# Patient Record
Sex: FEMALE | Race: WHITE | Employment: FULL TIME | URBAN - METROPOLITAN AREA
[De-identification: names, ages, dates, MRNs, and addresses within clinical notes are randomized per-mention and may not be internally consistent; named-entity substitution may affect disease eponyms.]

---

## 2022-10-14 ENCOUNTER — HOSPITAL ENCOUNTER (OUTPATIENT)
Age: 27
Setting detail: SPECIMEN
Discharge: HOME | End: 2022-10-14

## 2022-10-14 LAB
FERRITIN: 22 NG/ML (ref 13–150)
HCT VFR BLD CALC: 36.6 % (ref 36.3–47.1)
HEMOGLOBIN: 11.9 G/DL (ref 11.9–15.1)
IRON: 142 UG/DL (ref 37–145)
MCH RBC QN AUTO: 26.4 PG (ref 25.2–33.5)
MCHC RBC AUTO-ENTMCNC: 32.5 G/DL (ref 28.4–34.8)
MCV RBC AUTO: 81.3 FL (ref 82.6–102.9)
NRBC AUTOMATED: 0 PER 100 WBC
PDW BLD-RTO: 14 % (ref 11.8–14.4)
PLATELET # BLD: 229 K/UL (ref 138–453)
PMV BLD AUTO: 11 FL (ref 8.1–13.5)
RBC # BLD: 4.5 M/UL (ref 3.95–5.11)
WBC # BLD: 5.2 K/UL (ref 3.5–11.3)

## 2022-10-20 ENCOUNTER — HOSPITAL ENCOUNTER (OUTPATIENT)
Age: 27
Setting detail: SPECIMEN
Discharge: HOME | End: 2022-10-20

## 2022-10-20 LAB
ABSOLUTE EOS #: 0.07 K/UL (ref 0–0.44)
ABSOLUTE IMMATURE GRANULOCYTE: <0.03 K/UL (ref 0–0.3)
ABSOLUTE LYMPH #: 1.7 K/UL (ref 1.1–3.7)
ABSOLUTE MONO #: 0.19 K/UL (ref 0.1–1.2)
ABSOLUTE RETIC #: 0.1 M/UL (ref 0.03–0.08)
BASOPHILS # BLD: 1 % (ref 0–2)
BASOPHILS ABSOLUTE: 0.06 K/UL (ref 0–0.2)
EOSINOPHILS RELATIVE PERCENT: 2 % (ref 1–4)
HCT VFR BLD CALC: 37.3 % (ref 36.3–47.1)
HEMOGLOBIN: 11.8 G/DL (ref 11.9–15.1)
IMMATURE GRANULOCYTES: 0 %
IMMATURE RETIC FRACT: 20.2 % (ref 2.7–18.3)
LYMPHOCYTES # BLD: 38 % (ref 24–43)
MCH RBC QN AUTO: 26.5 PG (ref 25.2–33.5)
MCHC RBC AUTO-ENTMCNC: 31.6 G/DL (ref 28.4–34.8)
MCV RBC AUTO: 83.8 FL (ref 82.6–102.9)
MONOCYTES # BLD: 4 % (ref 3–12)
NRBC AUTOMATED: 0 PER 100 WBC
PDW BLD-RTO: 14.4 % (ref 11.8–14.4)
PLATELET # BLD: 217 K/UL (ref 138–453)
PMV BLD AUTO: 11.8 FL (ref 8.1–13.5)
RBC # BLD: 4.45 M/UL (ref 3.95–5.11)
RETIC %: 2.3 % (ref 0.5–1.9)
RETIC HEMOGLOBIN: 30.9 PG (ref 28.2–35.7)
SEG NEUTROPHILS: 55 % (ref 36–65)
SEGMENTED NEUTROPHILS ABSOLUTE COUNT: 2.46 K/UL (ref 1.5–8.1)
WBC # BLD: 4.5 K/UL (ref 3.5–11.3)

## 2022-10-21 LAB — SURGICAL PATHOLOGY REPORT: NORMAL

## 2022-10-22 LAB — PATHOLOGIST REVIEW: NORMAL

## 2023-06-26 ENCOUNTER — OFFICE VISIT (OUTPATIENT)
Age: 28
End: 2023-06-26
Payer: COMMERCIAL

## 2023-06-26 ENCOUNTER — HOSPITAL ENCOUNTER (OUTPATIENT)
Age: 28
Setting detail: SPECIMEN
Discharge: HOME | End: 2023-06-26

## 2023-06-26 VITALS
RESPIRATION RATE: 12 BRPM | BODY MASS INDEX: 42.75 KG/M2 | SYSTOLIC BLOOD PRESSURE: 112 MMHG | DIASTOLIC BLOOD PRESSURE: 63 MMHG | HEIGHT: 67 IN | HEART RATE: 65 BPM | WEIGHT: 272.4 LBS

## 2023-06-26 DIAGNOSIS — E66.01 CLASS 3 SEVERE OBESITY WITHOUT SERIOUS COMORBIDITY WITH BODY MASS INDEX (BMI) OF 40.0 TO 44.9 IN ADULT, UNSPECIFIED OBESITY TYPE (HCC): ICD-10-CM

## 2023-06-26 DIAGNOSIS — Z11.3 ROUTINE SCREENING FOR STI (SEXUALLY TRANSMITTED INFECTION): ICD-10-CM

## 2023-06-26 DIAGNOSIS — D50.9 MICROCYTIC ANEMIA: ICD-10-CM

## 2023-06-26 DIAGNOSIS — Z00.00 WELL WOMAN EXAM (NO GYNECOLOGICAL EXAM): Primary | ICD-10-CM

## 2023-06-26 DIAGNOSIS — Z00.00 WELL WOMAN EXAM (NO GYNECOLOGICAL EXAM): ICD-10-CM

## 2023-06-26 DIAGNOSIS — Z78.9 NONSMOKER: ICD-10-CM

## 2023-06-26 PROBLEM — E66.813 CLASS 3 SEVERE OBESITY WITHOUT SERIOUS COMORBIDITY WITH BODY MASS INDEX (BMI) OF 40.0 TO 44.9 IN ADULT (HCC): Status: ACTIVE | Noted: 2023-06-26

## 2023-06-26 LAB
25(OH)D3 SERPL-MCNC: 27.9 NG/ML
ANION GAP SERPL CALCULATED.3IONS-SCNC: 11 MMOL/L (ref 9–17)
BASOPHILS # BLD: 0.07 K/UL (ref 0–0.2)
BASOPHILS NFR BLD: 1 % (ref 0–2)
BUN SERPL-MCNC: 12 MG/DL (ref 6–20)
CALCIUM SERPL-MCNC: 9.2 MG/DL (ref 8.6–10.4)
CHLORIDE SERPL-SCNC: 103 MMOL/L (ref 98–107)
CO2 SERPL-SCNC: 24 MMOL/L (ref 20–31)
CREAT SERPL-MCNC: 0.9 MG/DL (ref 0.5–0.9)
EOSINOPHIL # BLD: 0.08 K/UL (ref 0–0.44)
EOSINOPHILS RELATIVE PERCENT: 1 % (ref 1–4)
ERYTHROCYTE [DISTWIDTH] IN BLOOD BY AUTOMATED COUNT: 14.3 % (ref 11.8–14.4)
FERRITIN SERPL-MCNC: 14 NG/ML (ref 13–150)
GFR SERPL CREATININE-BSD FRML MDRD: >60 ML/MIN/1.73M2
GLUCOSE SERPL-MCNC: 80 MG/DL (ref 70–99)
HCT VFR BLD AUTO: 37.5 % (ref 36.3–47.1)
HGB BLD-MCNC: 11.7 G/DL (ref 11.9–15.1)
IMM GRANULOCYTES # BLD AUTO: <0.03 K/UL (ref 0–0.3)
IMM GRANULOCYTES NFR BLD: 0 %
IRON SATN MFR SERPL: 15 % (ref 20–55)
IRON SERPL-MCNC: 55 UG/DL (ref 37–145)
LYMPHOCYTES # BLD: 32 % (ref 24–43)
LYMPHOCYTES NFR BLD: 1.88 K/UL (ref 1.1–3.7)
MCH RBC QN AUTO: 25.4 PG (ref 25.2–33.5)
MCHC RBC AUTO-ENTMCNC: 31.2 G/DL (ref 28.4–34.8)
MCV RBC AUTO: 81.5 FL (ref 82.6–102.9)
MONOCYTES NFR BLD: 0.41 K/UL (ref 0.1–1.2)
MONOCYTES NFR BLD: 7 % (ref 3–12)
NEUTROPHILS NFR BLD: 59 % (ref 36–65)
NEUTS SEG NFR BLD: 3.51 K/UL (ref 1.5–8.1)
NRBC BLD-RTO: 0 PER 100 WBC
PLATELET # BLD AUTO: 220 K/UL (ref 138–453)
PMV BLD AUTO: 10.8 FL (ref 8.1–13.5)
POTASSIUM SERPL-SCNC: 3.9 MMOL/L (ref 3.7–5.3)
RBC # BLD AUTO: 4.6 M/UL (ref 3.95–5.11)
RBC # BLD: ABNORMAL 10*6/UL
SODIUM SERPL-SCNC: 138 MMOL/L (ref 135–144)
T PALLIDUM AB SER QL IA: NONREACTIVE
TIBC SERPL-MCNC: 370 UG/DL (ref 250–450)
UNSATURATED IRON BINDING CAPACITY: 315 UG/DL (ref 112–347)
WBC OTHER # BLD: 6 K/UL (ref 3.5–11.3)

## 2023-06-26 PROCEDURE — 99385 PREV VISIT NEW AGE 18-39: CPT | Performed by: FAMILY MEDICINE

## 2023-06-26 RX ORDER — M-VIT,TX,IRON,MINS/CALC/FOLIC 27MG-0.4MG
1 TABLET ORAL DAILY
COMMUNITY

## 2023-06-26 RX ORDER — FERROUS SULFATE 325(65) MG
325 TABLET ORAL PRN
COMMUNITY

## 2023-06-26 SDOH — ECONOMIC STABILITY: FOOD INSECURITY: WITHIN THE PAST 12 MONTHS, YOU WORRIED THAT YOUR FOOD WOULD RUN OUT BEFORE YOU GOT MONEY TO BUY MORE.: NEVER TRUE

## 2023-06-26 SDOH — ECONOMIC STABILITY: FOOD INSECURITY: WITHIN THE PAST 12 MONTHS, THE FOOD YOU BOUGHT JUST DIDN'T LAST AND YOU DIDN'T HAVE MONEY TO GET MORE.: NEVER TRUE

## 2023-06-26 SDOH — ECONOMIC STABILITY: INCOME INSECURITY: HOW HARD IS IT FOR YOU TO PAY FOR THE VERY BASICS LIKE FOOD, HOUSING, MEDICAL CARE, AND HEATING?: NOT HARD AT ALL

## 2023-06-26 SDOH — ECONOMIC STABILITY: HOUSING INSECURITY
IN THE LAST 12 MONTHS, WAS THERE A TIME WHEN YOU DID NOT HAVE A STEADY PLACE TO SLEEP OR SLEPT IN A SHELTER (INCLUDING NOW)?: NO

## 2023-06-26 ASSESSMENT — PATIENT HEALTH QUESTIONNAIRE - PHQ9
2. FEELING DOWN, DEPRESSED OR HOPELESS: 0
SUM OF ALL RESPONSES TO PHQ QUESTIONS 1-9: 0
1. LITTLE INTEREST OR PLEASURE IN DOING THINGS: 0
SUM OF ALL RESPONSES TO PHQ9 QUESTIONS 1 & 2: 0
SUM OF ALL RESPONSES TO PHQ QUESTIONS 1-9: 0

## 2023-06-27 LAB
CHLAMYDIA DNA UR QL NAA+PROBE: NEGATIVE
HIV 1+2 AB+HIV1 P24 AG SERPL QL IA: NONREACTIVE
N GONORRHOEA DNA UR QL NAA+PROBE: NEGATIVE
SPECIMEN DESCRIPTION: NORMAL

## 2023-09-28 ASSESSMENT — ENCOUNTER SYMPTOMS
VOMITING: 0
CONSTIPATION: 0
ABDOMINAL PAIN: 0
SHORTNESS OF BREATH: 0
DIARRHEA: 0
SORE THROAT: 0
NAUSEA: 0

## 2023-09-28 NOTE — PROGRESS NOTES
[address] [facility] [address] [address] [facility]  [address] [facility], [facility] [facility]  Phone: [phone]  Fax: [phone]      Date of Visit:  10/1/2023  Patient Name: Trevor Magdaleno   Patient :  1995     HPI:     Trevor Magdaleno is a 28 y.o. female who presents today to discuss   Chief Complaint   Patient presents with    Snoring     Patient states that she has snore most of her life. Patient want to discuss [facility] that is non hormonal.      Patient presents to discuss starting birth control for pregnancy prevention/menorrhagia and snoring. Contraception/menorrhagia  Patient has a history of menorrhagia. She also recently moved in with her boyfriend and is sexually active but not actively planning for a pregnancy at this time. Her LMP was on 23 lasting 7-8 days with heavy flow. Menses occur every 28 days without significant cramping. Last pap 10/18/21 and negative. Patient is a nonsmoker and has never had migraines with aura, breast cancer, VTE, or ischemic heart disease. Pertinent family history includes mother being diagnosed with breast cancer at age 52. Snoring  Patient has a history of snoring ever since she was a child. Family and boyfriend do not report any witnessed apneic episodes. Snoring is worsened with fatigue and nasal congestion. She denies any daytime fatigue, sleepiness, or drowsiness. She reports sleep has improved since weight loss of about 20 lbs. I personally reviewed the patient's past medical history, current medications, allergies, surgical history, family history and social history. Updates were made as necessary. REVIEW OF SYSTEM      Review of Systems   Constitutional:  Negative for chills and fever. HENT:  Negative for congestion and sore throat. Snoring   Respiratory:  Negative for apnea, cough, shortness of breath and wheezing. Cardiovascular:  Negative for chest pain.    Gastrointestinal:  Negative for abdominal pain,

## 2023-09-29 ENCOUNTER — OFFICE VISIT (OUTPATIENT)
Age: 28
End: 2023-09-29
Payer: COMMERCIAL

## 2023-09-29 VITALS
HEIGHT: 67 IN | HEART RATE: 70 BPM | WEIGHT: 260.6 LBS | BODY MASS INDEX: 40.9 KG/M2 | TEMPERATURE: 98 F | SYSTOLIC BLOOD PRESSURE: 135 MMHG | RESPIRATION RATE: 16 BRPM | DIASTOLIC BLOOD PRESSURE: 50 MMHG

## 2023-09-29 DIAGNOSIS — N94.6 DYSMENORRHEA: ICD-10-CM

## 2023-09-29 DIAGNOSIS — R06.83 SNORING: ICD-10-CM

## 2023-09-29 DIAGNOSIS — E66.01 CLASS 3 SEVERE OBESITY WITHOUT SERIOUS COMORBIDITY WITH BODY MASS INDEX (BMI) OF 40.0 TO 44.9 IN ADULT, UNSPECIFIED OBESITY TYPE (HCC): ICD-10-CM

## 2023-09-29 DIAGNOSIS — N92.0 MENORRHAGIA WITH REGULAR CYCLE: Primary | ICD-10-CM

## 2023-09-29 PROCEDURE — 81025 URINE PREGNANCY TEST: CPT

## 2023-09-29 PROCEDURE — 99214 OFFICE O/P EST MOD 30 MIN: CPT

## 2023-09-29 PROCEDURE — 99211 OFF/OP EST MAY X REQ PHY/QHP: CPT

## 2023-09-29 RX ORDER — NORGESTIMATE AND ETHINYL ESTRADIOL 0.25-0.035
1 KIT ORAL DAILY
Qty: 1 PACKET | Refills: 3 | Status: SHIPPED | OUTPATIENT
Start: 2023-09-29

## 2023-09-29 RX ORDER — NORGESTIMATE AND ETHINYL ESTRADIOL 0.25-0.035
1 KIT ORAL DAILY
Qty: 1 PACKET | Refills: 3 | Status: CANCELLED | OUTPATIENT
Start: 2023-09-29

## 2023-09-29 RX ORDER — FLUTICASONE PROPIONATE 50 MCG
2 SPRAY, SUSPENSION (ML) NASAL DAILY
Qty: 16 G | Refills: 0 | Status: CANCELLED | OUTPATIENT
Start: 2023-09-29

## 2023-09-29 NOTE — PATIENT INSTRUCTIONS
Thank you for following up with us at [facility] outpatient residency clinic! It was a pleasure to meet you today! Our plan is the following:  - Please  your medications from the pharmacy.  - You can start taking the birth control pill any time. Just make sure you take it daily and around the same time. See handout for further information.  - For the snoring, you may try Flonase, nasal saline rinses and Zyrtec to see if this is secondary to allergies. If you have any additional questions or concerns, please call the office ([phone]) and speak to one of the staff. They will triage and forward the message to the doctors! Have a great rest of your day!

## 2023-10-01 ASSESSMENT — ENCOUNTER SYMPTOMS
APNEA: 0
WHEEZING: 0
COUGH: 0

## 2023-10-29 NOTE — PROGRESS NOTES
[address] [facility]  [address] [facility], [address]  Phone: [phone]  Fax: [phone]      Date of Visit:  2023  Patient Name: Rojas Neves   Patient :  1995     HPI:     Rojas Nevse is a 28 y.o. female who presents today to discuss   Chief Complaint   Patient presents with    Snoring     Sprintec check cramping  frequent headaches nausea brain fog heavy period      This is a 28 year female with a history of iron deficiency anemia, obesity, and menorrhagia who presents to follow up on medication. She was started on Sprintec 28 at the last visit for menorrhagia and contraception. Her LMP was around 10/3/23. Patient complains of heavy bleeding (more than baseline), frequent intermittent headaches, nausea, cramping, fatigue, and random bouts of anxiety while taking the medication. Pain was well-controlled with ibuprofen. Symptoms resolve when patient is taking non-hormonal placebo pills. Patient would like to continue OCP for one more month and observe for changes in symptoms. Her fiance will be going out of country in a couple of months should she need a break from OCPs. No other acute concerns. I personally reviewed the patient's past medical history, current medications, allergies, surgical history, family history and social history. Updates were made as necessary. REVIEW OF SYSTEM      Review of Systems   Constitutional:  Positive for fatigue. Negative for chills and fever. Gastrointestinal:  Positive for abdominal pain (intermittent cramping) and nausea. Negative for constipation and diarrhea. Genitourinary:  Positive for menstrual problem (increased bleeding during period). Negative for dyspareunia, dysuria, frequency and urgency. Neurological:  Positive for headaches (intermittent). Psychiatric/Behavioral:  The patient is nervous/anxious (intermittent).         REVIEWED INFORMATION      Allergies   Allergen

## 2023-11-01 ENCOUNTER — OFFICE VISIT (OUTPATIENT)
Age: 28
End: 2023-11-01
Payer: COMMERCIAL

## 2023-11-01 VITALS
BODY MASS INDEX: 41.11 KG/M2 | DIASTOLIC BLOOD PRESSURE: 65 MMHG | SYSTOLIC BLOOD PRESSURE: 132 MMHG | HEART RATE: 61 BPM | WEIGHT: 258.6 LBS | RESPIRATION RATE: 12 BRPM

## 2023-11-01 DIAGNOSIS — N92.0 MENORRHAGIA WITH REGULAR CYCLE: Primary | ICD-10-CM

## 2023-11-01 DIAGNOSIS — R11.0 NAUSEA: ICD-10-CM

## 2023-11-01 DIAGNOSIS — Z78.9 NONSMOKER: ICD-10-CM

## 2023-11-01 DIAGNOSIS — E66.01 CLASS 3 SEVERE OBESITY WITHOUT SERIOUS COMORBIDITY WITH BODY MASS INDEX (BMI) OF 40.0 TO 44.9 IN ADULT, UNSPECIFIED OBESITY TYPE (HCC): ICD-10-CM

## 2023-11-01 PROCEDURE — 99211 OFF/OP EST MAY X REQ PHY/QHP: CPT

## 2023-11-01 RX ORDER — ONDANSETRON 4 MG/1
4 TABLET, FILM COATED ORAL 3 TIMES DAILY PRN
Qty: 30 TABLET | Refills: 0 | Status: SHIPPED | OUTPATIENT
Start: 2023-11-01

## 2023-11-01 ASSESSMENT — ENCOUNTER SYMPTOMS
NAUSEA: 1
ABDOMINAL PAIN: 1
CONSTIPATION: 0
DIARRHEA: 0

## 2023-11-01 NOTE — PATIENT INSTRUCTIONS
Thank you for following up with us at [facility] outpatient residency clinic! It was a pleasure to meet you today! Our plan is the following:  - Please  your medications from the pharmacy. Please use as needed for nausea and vomiting.  - We will continue to monitor symptoms on the oral contraceptive. Please let me know if bleeding or headaches worsen, lightheadedness, dizziness, or other symptoms arise. It may take some time for your body to adjust to the medication, but certainly reach out if the symptoms become unbearable. - Continue working out, eating healthy and staying hydrated. Weight loss can improve both the snoring and the heaviness of the periods. If you have any additional questions or concerns, please call the office ([phone]) and speak to one of the staff. They will triage and forward the message to the doctors! Have a great rest of your day!

## 2023-12-07 PROBLEM — N92.0 MENORRHAGIA WITH REGULAR CYCLE: Status: ACTIVE | Noted: 2023-12-07

## 2023-12-07 NOTE — PROGRESS NOTES
occurred      Patient was discussed with preceptor Dr. Theresa Myrick at the time of the encounter.      Electronically signed by Mo Hurtado MD on 12/8/2023 at 1:27 PM

## 2023-12-08 ENCOUNTER — HOSPITAL ENCOUNTER (OUTPATIENT)
Age: 28
Setting detail: SPECIMEN
Discharge: HOME | End: 2023-12-08

## 2023-12-08 ENCOUNTER — OFFICE VISIT (OUTPATIENT)
Age: 28
End: 2023-12-08
Payer: COMMERCIAL

## 2023-12-08 VITALS
SYSTOLIC BLOOD PRESSURE: 126 MMHG | RESPIRATION RATE: 18 BRPM | BODY MASS INDEX: 40.05 KG/M2 | HEART RATE: 84 BPM | WEIGHT: 251.9 LBS | TEMPERATURE: 98.1 F | DIASTOLIC BLOOD PRESSURE: 69 MMHG

## 2023-12-08 DIAGNOSIS — N92.0 MENORRHAGIA WITH REGULAR CYCLE: Primary | ICD-10-CM

## 2023-12-08 DIAGNOSIS — N92.0 MENORRHAGIA WITH REGULAR CYCLE: ICD-10-CM

## 2023-12-08 DIAGNOSIS — D50.9 MICROCYTIC ANEMIA: ICD-10-CM

## 2023-12-08 DIAGNOSIS — Z78.9 NONSMOKER: ICD-10-CM

## 2023-12-08 LAB
ERYTHROCYTE [DISTWIDTH] IN BLOOD BY AUTOMATED COUNT: 12.6 % (ref 11.8–14.4)
HCT VFR BLD AUTO: 35.1 % (ref 36.3–47.1)
HGB BLD-MCNC: 10.9 G/DL (ref 11.9–15.1)
MCH RBC QN AUTO: 26.4 PG (ref 25.2–33.5)
MCHC RBC AUTO-ENTMCNC: 31.1 G/DL (ref 28.4–34.8)
MCV RBC AUTO: 85 FL (ref 82.6–102.9)
NRBC BLD-RTO: 0 PER 100 WBC
PLATELET # BLD AUTO: 257 K/UL (ref 138–453)
PMV BLD AUTO: 11.4 FL (ref 8.1–13.5)
RBC # BLD AUTO: 4.13 M/UL (ref 3.95–5.11)
WBC OTHER # BLD: 6 K/UL (ref 3.5–11.3)

## 2023-12-08 PROCEDURE — 99212 OFFICE O/P EST SF 10 MIN: CPT

## 2023-12-08 PROCEDURE — 99213 OFFICE O/P EST LOW 20 MIN: CPT

## 2023-12-08 ASSESSMENT — ENCOUNTER SYMPTOMS
CONSTIPATION: 0
DIARRHEA: 0
ABDOMINAL PAIN: 0
VOMITING: 0
SHORTNESS OF BREATH: 0

## 2023-12-08 NOTE — PATIENT INSTRUCTIONS
Thank you for following up with us at [facility] outpatient residency clinic! It was a pleasure to meet you today! Our plan is the following:  - Please get your labs done. My office or I will reach out with the results once they are available. - Please take your iron daily. Make sure you drink enough water throughout the day to help prevent constipation and dehydration. If you have any additional questions or concerns, please call the office ([phone]) and speak to one of the staff. They will triage and forward the message to the doctors! Have a great rest of your day!

## 2023-12-14 LAB
FERRITIN SERPL-MCNC: 11 NG/ML (ref 13–150)
IRON SATN MFR SERPL: 46 % (ref 20–55)
IRON SERPL-MCNC: 168 UG/DL (ref 37–145)
TIBC SERPL-MCNC: 368 UG/DL (ref 250–450)
UNSATURATED IRON BINDING CAPACITY: 200 UG/DL (ref 112–347)

## 2024-07-18 PROBLEM — D50.0 IRON DEFICIENCY ANEMIA DUE TO CHRONIC BLOOD LOSS: Status: ACTIVE | Noted: 2024-07-18

## 2024-07-18 NOTE — PROGRESS NOTES
[facility]  [address] [place] [address]  Phone: [phone]  Fax: [phone]      Date of Visit:  2024  Patient Name: Mark Horne   Patient :  1995     HPI:     Mark Horne is a 28 y.o. female who presents today to discuss   Chief Complaint   Patient presents with    Follow-up     6 month check up- Wants referral to general surgeon for pilonidal cyst, has gone before but just didn't have it removed...    Off BC for 6 months now...periods lighter    Labs for iron    When is she due for pap??   This is a 27 year female with a history of iron deficiency anemia, obesity, and menorrhagia who presents to follow up visit.     Patient has been relatively well since the last visit.  She has been compliant with her medications.  States that ever since discontinuing birth control she has noted her periods to be slightly lighter than before.  Menses are are monthly and lasting 7 days with heavy flow for the first 2 days followed by light flow.  Previously, her periods were heavy every single day and lasted about 8 days.  No dysmenorrhea.  She denies any fatigue, lightheadedness, craving for ice chips, weakness.  Patient is not on any oral contraceptives at this time.  She does take a daily multivitamin.  Her fiancé/significant other is currently out of country so she is not sexually active.    Patient would like a referral to general surgery for excision of recurring pilonidal cyst.  She had a flareup about a week ago.  The cyst was draining bloody discharge and tender to touch.  She denies any fevers or chills, or swelling.  States that the cyst has since healed and is not bothering her today but she would like this to be addressed at future recurrence.  No other acute concerns    I personally reviewed the patient's past medical history, current medications, allergies, surgical history, family history and social history.  Updates

## 2024-07-19 ENCOUNTER — OFFICE VISIT (OUTPATIENT)
Age: 29
End: 2024-07-19
Payer: COMMERCIAL

## 2024-07-19 ENCOUNTER — HOSPITAL ENCOUNTER (OUTPATIENT)
Age: 29
Setting detail: SPECIMEN
Discharge: HOME | End: 2024-07-19

## 2024-07-19 VITALS
WEIGHT: 278 LBS | SYSTOLIC BLOOD PRESSURE: 120 MMHG | DIASTOLIC BLOOD PRESSURE: 77 MMHG | HEIGHT: 67 IN | TEMPERATURE: 98.3 F | HEART RATE: 80 BPM | BODY MASS INDEX: 43.63 KG/M2 | RESPIRATION RATE: 16 BRPM

## 2024-07-19 DIAGNOSIS — L05.91 PILONIDAL CYST: ICD-10-CM

## 2024-07-19 DIAGNOSIS — N92.0 MENORRHAGIA WITH REGULAR CYCLE: ICD-10-CM

## 2024-07-19 DIAGNOSIS — D50.0 IRON DEFICIENCY ANEMIA DUE TO CHRONIC BLOOD LOSS: ICD-10-CM

## 2024-07-19 DIAGNOSIS — D50.0 IRON DEFICIENCY ANEMIA DUE TO CHRONIC BLOOD LOSS: Primary | ICD-10-CM

## 2024-07-19 LAB
ERYTHROCYTE [DISTWIDTH] IN BLOOD BY AUTOMATED COUNT: 13.2 % (ref 11.8–14.4)
FERRITIN SERPL-MCNC: 31 NG/ML (ref 13–150)
HCT VFR BLD AUTO: 40.1 % (ref 36.3–47.1)
HGB BLD-MCNC: 13.2 G/DL (ref 11.9–15.1)
IRON SATN MFR SERPL: 19 % (ref 20–55)
IRON SERPL-MCNC: 59 UG/DL (ref 37–145)
MCH RBC QN AUTO: 29 PG (ref 25.2–33.5)
MCHC RBC AUTO-ENTMCNC: 32.9 G/DL (ref 28.4–34.8)
MCV RBC AUTO: 88.1 FL (ref 82.6–102.9)
NRBC BLD-RTO: 0 PER 100 WBC
PLATELET # BLD AUTO: 198 K/UL (ref 138–453)
PMV BLD AUTO: 11.5 FL (ref 8.1–13.5)
RBC # BLD AUTO: 4.55 M/UL (ref 3.95–5.11)
TIBC SERPL-MCNC: 314 UG/DL (ref 250–450)
UNSATURATED IRON BINDING CAPACITY: 255 UG/DL (ref 112–347)
WBC OTHER # BLD: 7.1 K/UL (ref 3.5–11.3)

## 2024-07-19 PROCEDURE — 1036F TOBACCO NON-USER: CPT

## 2024-07-19 PROCEDURE — G8427 DOCREV CUR MEDS BY ELIG CLIN: HCPCS

## 2024-07-19 PROCEDURE — G8417 CALC BMI ABV UP PARAM F/U: HCPCS

## 2024-07-19 PROCEDURE — 99214 OFFICE O/P EST MOD 30 MIN: CPT

## 2024-07-19 PROCEDURE — 99212 OFFICE O/P EST SF 10 MIN: CPT

## 2024-07-19 SDOH — ECONOMIC STABILITY: INCOME INSECURITY: HOW HARD IS IT FOR YOU TO PAY FOR THE VERY BASICS LIKE FOOD, HOUSING, MEDICAL CARE, AND HEATING?: NOT HARD AT ALL

## 2024-07-19 SDOH — ECONOMIC STABILITY: FOOD INSECURITY: WITHIN THE PAST 12 MONTHS, THE FOOD YOU BOUGHT JUST DIDN'T LAST AND YOU DIDN'T HAVE MONEY TO GET MORE.: NEVER TRUE

## 2024-07-19 SDOH — ECONOMIC STABILITY: FOOD INSECURITY: WITHIN THE PAST 12 MONTHS, YOU WORRIED THAT YOUR FOOD WOULD RUN OUT BEFORE YOU GOT MONEY TO BUY MORE.: NEVER TRUE

## 2024-07-19 ASSESSMENT — PATIENT HEALTH QUESTIONNAIRE - PHQ9
SUM OF ALL RESPONSES TO PHQ QUESTIONS 1-9: 0
SUM OF ALL RESPONSES TO PHQ QUESTIONS 1-9: 0
2. FEELING DOWN, DEPRESSED OR HOPELESS: NOT AT ALL
SUM OF ALL RESPONSES TO PHQ9 QUESTIONS 1 & 2: 0
1. LITTLE INTEREST OR PLEASURE IN DOING THINGS: NOT AT ALL
SUM OF ALL RESPONSES TO PHQ QUESTIONS 1-9: 0
SUM OF ALL RESPONSES TO PHQ QUESTIONS 1-9: 0

## 2024-07-19 NOTE — PATIENT INSTRUCTIONS
Thank you for following up with us at [facility] outpatient residency clinic! It was a pleasure to meet you today!     Our plan is the following:  - Please get your labs done. I will let you know the results as they become available.  - Referral to General Surgery has been sent. Call to make an appointment  - I will see you back for a physical and pap in Sept    If you have any additional questions or concerns, please call the office ([phone]) and speak to one of the staff. They will triage and forward the message to the doctors! Have a great rest of your day!

## 2024-09-11 ENCOUNTER — OFFICE VISIT (OUTPATIENT)
Age: 29
End: 2024-09-11
Payer: COMMERCIAL

## 2024-09-11 ENCOUNTER — HOSPITAL ENCOUNTER (OUTPATIENT)
Age: 29
Setting detail: SPECIMEN
Discharge: HOME | End: 2024-09-11

## 2024-09-11 VITALS
SYSTOLIC BLOOD PRESSURE: 129 MMHG | HEART RATE: 81 BPM | RESPIRATION RATE: 18 BRPM | BODY MASS INDEX: 44.83 KG/M2 | TEMPERATURE: 98.3 F | WEIGHT: 282 LBS | DIASTOLIC BLOOD PRESSURE: 63 MMHG

## 2024-09-11 DIAGNOSIS — D50.0 IRON DEFICIENCY ANEMIA DUE TO CHRONIC BLOOD LOSS: ICD-10-CM

## 2024-09-11 DIAGNOSIS — E66.01 CLASS 3 SEVERE OBESITY WITHOUT SERIOUS COMORBIDITY WITH BODY MASS INDEX (BMI) OF 40.0 TO 44.9 IN ADULT, UNSPECIFIED OBESITY TYPE (HCC): ICD-10-CM

## 2024-09-11 DIAGNOSIS — Z01.419 WELL WOMAN EXAM WITH ROUTINE GYNECOLOGICAL EXAM: Primary | ICD-10-CM

## 2024-09-11 PROCEDURE — 99212 OFFICE O/P EST SF 10 MIN: CPT

## 2024-09-11 PROCEDURE — 99395 PREV VISIT EST AGE 18-39: CPT | Performed by: STUDENT IN AN ORGANIZED HEALTH CARE EDUCATION/TRAINING PROGRAM

## 2024-09-11 ASSESSMENT — ENCOUNTER SYMPTOMS
CONSTIPATION: 0
SHORTNESS OF BREATH: 0
ABDOMINAL PAIN: 0
DIARRHEA: 0

## 2024-09-20 LAB — CYTOLOGY REPORT: NORMAL

## 2025-06-10 SDOH — ECONOMIC STABILITY: TRANSPORTATION INSECURITY
IN THE PAST 12 MONTHS, HAS LACK OF TRANSPORTATION KEPT YOU FROM MEETINGS, WORK, OR FROM GETTING THINGS NEEDED FOR DAILY LIVING?: NO

## 2025-06-10 SDOH — ECONOMIC STABILITY: FOOD INSECURITY: WITHIN THE PAST 12 MONTHS, THE FOOD YOU BOUGHT JUST DIDN'T LAST AND YOU DIDN'T HAVE MONEY TO GET MORE.: NEVER TRUE

## 2025-06-10 SDOH — ECONOMIC STABILITY: FOOD INSECURITY: WITHIN THE PAST 12 MONTHS, YOU WORRIED THAT YOUR FOOD WOULD RUN OUT BEFORE YOU GOT MONEY TO BUY MORE.: NEVER TRUE

## 2025-06-10 SDOH — ECONOMIC STABILITY: INCOME INSECURITY: IN THE LAST 12 MONTHS, WAS THERE A TIME WHEN YOU WERE NOT ABLE TO PAY THE MORTGAGE OR RENT ON TIME?: NO

## 2025-06-10 SDOH — ECONOMIC STABILITY: TRANSPORTATION INSECURITY
IN THE PAST 12 MONTHS, HAS THE LACK OF TRANSPORTATION KEPT YOU FROM MEDICAL APPOINTMENTS OR FROM GETTING MEDICATIONS?: NO

## 2025-06-12 NOTE — PROGRESS NOTES
[facility]  [address] [place] [address]  Phone: [phone]  Fax: [phone]      Date of Visit:  2025  Patient Name: Mark Horne   Patient :  1995     HPI:     Mark Horne is a 29 y.o. female who presents today to discuss   Chief Complaint   Patient presents with    Follow-up     In May had an experience where she bled heavily for four days and then it stopped, hasn't had it happen again. Since then has had regular periods.      History of Present Illness  The patient is a 29-year-old female who presents for follow-up of anemia and vaginal bleeding.    She reports an episode of unexpected bleeding in early 2025, which occurred a day after sexual intercourse with her . The bleeding was significant but ceased after approximately 4 hours. This was followed by her regular menstrual cycle a week and a half later. This is the first instance of intermenstrual bleeding. Her current menstrual cycle started 2 days ago and has been notably lighter than the previous four cycles. She maintains a consistent use of condoms for contraception. She reports no stress, abdominal pain, pelvic pain, nausea, or vomiting. She also reports occasional dizziness during heavy menstrual periods, which she attributes to blood loss. She reports no other discomforts.    I personally reviewed the patient's past medical history, current medications, allergies, surgical history, family history and social history.  Updates were made as necessary.    REVIEW OF SYSTEM      Review of Systems   Constitutional:  Negative for chills and fever.   Respiratory:  Negative for shortness of breath.    Cardiovascular:  Negative for chest pain.   Gastrointestinal:  Negative for abdominal pain, constipation and diarrhea.   Genitourinary:  Positive for menstrual problem. Negative for dyspareunia, hematuria, pelvic pain, vaginal bleeding, vaginal discharge and

## 2025-06-13 ENCOUNTER — OFFICE VISIT (OUTPATIENT)
Age: 30
End: 2025-06-13
Payer: COMMERCIAL

## 2025-06-13 ENCOUNTER — HOSPITAL ENCOUNTER (OUTPATIENT)
Age: 30
Setting detail: SPECIMEN
Discharge: HOME | End: 2025-06-13

## 2025-06-13 VITALS
SYSTOLIC BLOOD PRESSURE: 149 MMHG | HEIGHT: 66 IN | RESPIRATION RATE: 18 BRPM | TEMPERATURE: 97.8 F | BODY MASS INDEX: 45.72 KG/M2 | DIASTOLIC BLOOD PRESSURE: 79 MMHG | WEIGHT: 284.5 LBS | HEART RATE: 59 BPM

## 2025-06-13 DIAGNOSIS — D50.0 IRON DEFICIENCY ANEMIA DUE TO CHRONIC BLOOD LOSS: ICD-10-CM

## 2025-06-13 DIAGNOSIS — N92.3 INTERMENSTRUAL BLEEDING: ICD-10-CM

## 2025-06-13 DIAGNOSIS — N92.3 INTERMENSTRUAL BLEEDING: Primary | ICD-10-CM

## 2025-06-13 LAB
CONTROL: PRESENT
ERYTHROCYTE [DISTWIDTH] IN BLOOD BY AUTOMATED COUNT: 12.7 % (ref 11.8–14.4)
FERRITIN SERPL-MCNC: 32 NG/ML (ref 15–150)
HCT VFR BLD AUTO: 37.8 % (ref 36.3–47.1)
HGB BLD-MCNC: 12.6 G/DL (ref 11.9–15.1)
IRON SATN MFR SERPL: 29 % (ref 20–55)
IRON SERPL-MCNC: 88 UG/DL (ref 37–145)
MCH RBC QN AUTO: 28.9 PG (ref 25.2–33.5)
MCHC RBC AUTO-ENTMCNC: 33.3 G/DL (ref 28.4–34.8)
MCV RBC AUTO: 86.7 FL (ref 82.6–102.9)
NRBC BLD-RTO: 0 PER 100 WBC
PLATELET # BLD AUTO: 213 K/UL (ref 138–453)
PMV BLD AUTO: 11 FL (ref 8.1–13.5)
PREGNANCY TEST URINE, POC: NEGATIVE
RBC # BLD AUTO: 4.36 M/UL (ref 3.95–5.11)
TIBC SERPL-MCNC: 302 UG/DL (ref 250–450)
TSH SERPL DL<=0.05 MIU/L-ACNC: 1.76 UIU/ML (ref 0.27–4.2)
UNSATURATED IRON BINDING CAPACITY: 214 UG/DL (ref 112–347)
WBC OTHER # BLD: 5.7 K/UL (ref 3.5–11.3)

## 2025-06-13 PROCEDURE — 81025 URINE PREGNANCY TEST: CPT | Performed by: FAMILY MEDICINE

## 2025-06-13 PROCEDURE — G8427 DOCREV CUR MEDS BY ELIG CLIN: HCPCS | Performed by: FAMILY MEDICINE

## 2025-06-13 PROCEDURE — G8417 CALC BMI ABV UP PARAM F/U: HCPCS | Performed by: FAMILY MEDICINE

## 2025-06-13 PROCEDURE — 1036F TOBACCO NON-USER: CPT | Performed by: FAMILY MEDICINE

## 2025-06-13 PROCEDURE — 99213 OFFICE O/P EST LOW 20 MIN: CPT | Performed by: FAMILY MEDICINE

## 2025-06-13 ASSESSMENT — PATIENT HEALTH QUESTIONNAIRE - PHQ9
SUM OF ALL RESPONSES TO PHQ QUESTIONS 1-9: 0
SUM OF ALL RESPONSES TO PHQ QUESTIONS 1-9: 0
1. LITTLE INTEREST OR PLEASURE IN DOING THINGS: NOT AT ALL
2. FEELING DOWN, DEPRESSED OR HOPELESS: NOT AT ALL
SUM OF ALL RESPONSES TO PHQ QUESTIONS 1-9: 0
SUM OF ALL RESPONSES TO PHQ QUESTIONS 1-9: 0

## 2025-06-13 NOTE — PATIENT INSTRUCTIONS
Thank you for following up with us at [facility] outpatient residency clinic! It was a pleasure to meet you today!     Our plan is the following:  - Please get your labs done before you leave.  - Call to schedule your ultrasound.  I will either message or call you with the results.  - You can speak to the  about your medical records and transition of care.    If you have any additional questions or concerns, please call the office ([phone]) and speak to one of the staff. They will triage and forward the message to the doctors! Have a great rest of your day!

## 2025-06-17 ENCOUNTER — RESULTS FOLLOW-UP (OUTPATIENT)
Age: 30
End: 2025-06-17

## 2025-06-17 DIAGNOSIS — R93.89 ENDOMETRIAL THICKENING ON ULTRASOUND: Primary | ICD-10-CM

## 2025-06-18 ENCOUNTER — HOSPITAL ENCOUNTER (OUTPATIENT)
Dept: ULTRASOUND IMAGING | Age: 30
Discharge: HOME | End: 2025-06-20
Payer: COMMERCIAL

## 2025-06-18 DIAGNOSIS — N92.3 INTERMENSTRUAL BLEEDING: ICD-10-CM

## 2025-06-18 PROCEDURE — 76830 TRANSVAGINAL US NON-OB: CPT

## 2025-06-24 ENCOUNTER — PATIENT MESSAGE (OUTPATIENT)
Age: 30
End: 2025-06-24

## 2025-06-24 NOTE — TELEPHONE ENCOUNTER
Kinsey Price you have received an attachment to your order to call centralize scheduling (P) [phone]. Please call to schedule.  Feel free to message us again if you continue to have issues. Thanks.

## 2025-06-25 ENCOUNTER — TELEPHONE (OUTPATIENT)
Age: 30
End: 2025-06-25

## 2025-06-25 NOTE — TELEPHONE ENCOUNTER
Romero from [facility] called regarding hystosonogram that was ordered.  States ordering provider performs these at the hospital. Asking who is going to perform this test.  Please advise.

## 2025-06-27 DIAGNOSIS — N92.3 INTERMENSTRUAL BLEEDING: Primary | ICD-10-CM

## 2025-06-27 NOTE — TELEPHONE ENCOUNTER
Called [facility] centralized scheduling to see if they do Hystosonogram which they do not complete at . 's also called [facility] and they do not do it. I also called [facility] and they do not perform the testing.  Called [facility] to see they will do the testing which they don't. Verbally spoken to Dr. Benitez about this matter. She states to ask patient if she would like to have a referral to OB/GYN for a second option. Please ask and let provider know. Thanks.

## 2025-06-27 NOTE — PROGRESS NOTES
Referral to GYN sent.    US NON OB TRANSVAGINAL  Result Date: 6/19/2025    Polycystic ovarian morphology bilaterally.  Correlate clinically and biochemically as this finding alone is supportive but not diagnostic of polycystic ovarian syndrome. Thickened endometrial stripe greater than that expected for a last menstrual period reported as 06/11/2025.  No abnormal vascularity or discrete focal mass of the endometrium; however, a small endometrial lesion could be obscured.  Consider sonohysterogram if concern persists to exclude endometrial lesion.

## 2025-06-27 NOTE — TELEPHONE ENCOUNTER
Called [facility] centralized scheduling to see if they do Hystosonogram which they do not complete at . 's also called [facility] and they do not do it. I also called [facility] and they do not perform the testing.

## 2025-06-27 NOTE — TELEPHONE ENCOUNTER
Dr. Benitez went ahead and placed OB-GYN referral overall, but please let her know about the info below. Referred to Dr. Ny P) [phone]. Thanks. MARCO ANTONIO

## 2025-07-03 ENCOUNTER — TELEPHONE (OUTPATIENT)
Dept: OBGYN CLINIC | Age: 30
End: 2025-07-03